# Patient Record
Sex: MALE | Race: WHITE | NOT HISPANIC OR LATINO | Employment: STUDENT | URBAN - METROPOLITAN AREA
[De-identification: names, ages, dates, MRNs, and addresses within clinical notes are randomized per-mention and may not be internally consistent; named-entity substitution may affect disease eponyms.]

---
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
## 2022-02-21 ENCOUNTER — TELEPHONE (OUTPATIENT)
Dept: PODIATRY | Facility: CLINIC | Age: 22
End: 2022-02-21
Payer: MEDICAID

## 2022-02-21 NOTE — TELEPHONE ENCOUNTER
Pts mother declined an appointment at a different location than [place] for an ingrown toenail. She states she will go outside of [facility].

## 2022-02-21 NOTE — TELEPHONE ENCOUNTER
----- Message from César Cline sent at 2/21/2022  9:56 AM CST -----  Contact: mother  Pt needs to schedule appt for ingrown toe nail    Call back mother @[phone]

## 2022-05-03 ENCOUNTER — OFFICE VISIT (OUTPATIENT)
Dept: PODIATRY | Facility: CLINIC | Age: 22
End: 2022-05-03
Payer: MEDICAID

## 2022-05-03 VITALS
BODY MASS INDEX: 35.81 KG/M2 | HEIGHT: 75 IN | HEART RATE: 89 BPM | DIASTOLIC BLOOD PRESSURE: 69 MMHG | SYSTOLIC BLOOD PRESSURE: 127 MMHG | WEIGHT: 288 LBS

## 2022-05-03 DIAGNOSIS — L60.3 NONTRAUMATIC NAIL SPLITTING: ICD-10-CM

## 2022-05-03 DIAGNOSIS — M12.879 TRANSIENT ARTHROPATHY INVOLVING ANKLE AND FOOT, UNSPECIFIED LATERALITY: ICD-10-CM

## 2022-05-03 DIAGNOSIS — L84 CORN OF TOE: Primary | ICD-10-CM

## 2022-05-03 PROCEDURE — 1159F PR MEDICATION LIST DOCUMENTED IN MEDICAL RECORD: ICD-10-PCS | Mod: CPTII,,, | Performed by: PODIATRIST

## 2022-05-03 PROCEDURE — 99999 PR PBB SHADOW E&M-EST. PATIENT-LVL III: CPT | Mod: PBBFAC,,, | Performed by: PODIATRIST

## 2022-05-03 PROCEDURE — 3078F PR MOST RECENT DIASTOLIC BLOOD PRESSURE < 80 MM HG: ICD-10-PCS | Mod: CPTII,,, | Performed by: PODIATRIST

## 2022-05-03 PROCEDURE — 1159F MED LIST DOCD IN RCRD: CPT | Mod: CPTII,,, | Performed by: PODIATRIST

## 2022-05-03 PROCEDURE — 99999 PR PBB SHADOW E&M-EST. PATIENT-LVL III: ICD-10-PCS | Mod: PBBFAC,,, | Performed by: PODIATRIST

## 2022-05-03 PROCEDURE — 3008F BODY MASS INDEX DOCD: CPT | Mod: CPTII,,, | Performed by: PODIATRIST

## 2022-05-03 PROCEDURE — 99203 PR OFFICE/OUTPT VISIT, NEW, LEVL III, 30-44 MIN: ICD-10-PCS | Mod: S$PBB,,, | Performed by: PODIATRIST

## 2022-05-03 PROCEDURE — 99213 OFFICE O/P EST LOW 20 MIN: CPT | Mod: PBBFAC,PN | Performed by: PODIATRIST

## 2022-05-03 PROCEDURE — 99203 OFFICE O/P NEW LOW 30 MIN: CPT | Mod: S$PBB,,, | Performed by: PODIATRIST

## 2022-05-03 PROCEDURE — 3078F DIAST BP <80 MM HG: CPT | Mod: CPTII,,, | Performed by: PODIATRIST

## 2022-05-03 PROCEDURE — 3074F PR MOST RECENT SYSTOLIC BLOOD PRESSURE < 130 MM HG: ICD-10-PCS | Mod: CPTII,,, | Performed by: PODIATRIST

## 2022-05-03 PROCEDURE — 3074F SYST BP LT 130 MM HG: CPT | Mod: CPTII,,, | Performed by: PODIATRIST

## 2022-05-03 PROCEDURE — 3008F PR BODY MASS INDEX (BMI) DOCUMENTED: ICD-10-PCS | Mod: CPTII,,, | Performed by: PODIATRIST

## 2022-05-03 NOTE — PROGRESS NOTES
Subjective:      Patient ID: Bro Lozada is a 21 y.o. male.    Chief Complaint: Foot Pain (Both feet. L little toe)    Bro GUO is a 21 y.o. male who presents to the podiatry clinic, accompanied by his mother, with complaint of ingrown toenail L 5th toe as well as bilateral midfoot pain.   L little toe pain w/ walking, dependent on shoe type, over past several weeks or months.  B/L midfoot pain - Onset of the symptoms was earlier teens when started growing & trying to balance - proprioceptive problems & sensory issues due to autism. Current symptoms include: worsening symptoms after a period of activity. 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 Symptoms have waxed and waned. Pain level 8/10 when occurs.    Past Medical History:   Diagnosis Date    Asperger's disorder     Kyphosis      Patient Active Problem List   Diagnosis    Kyphosis    Palpitations    Pediatric obesity due to excess calories without serious comorbidity    SVT (supraventricular tachycardia)     PCP: Ramonita Fields NP     Objective:      Review of Systems   Unable to perform ROS: other (mother answers most questions including subjective c/o)   Cardiovascular: Negative for leg swelling.   Skin: Positive for dry skin and suspicious lesions. Negative for color change, itching and rash.   Musculoskeletal: Positive for back pain, muscle weakness and myalgias. Negative for falls, joint pain and muscle cramps.   Neurological: Positive for weakness. Negative for focal weakness, loss of balance, numbness and paresthesias.   Psychiatric/Behavioral: The patient is not nervous/anxious.      Physical Exam  Vitals reviewed.   Constitutional:       General: He is not in acute distress.     Appearance: He is obese.   Cardiovascular:      Pulses: Normal pulses.           Dorsalis pedis pulses are 2+ on the right side and 2+ on the left side.   Musculoskeletal:         General: Tenderness present. No swelling  or signs of injury.      Right lower leg: Edema present.      Left lower leg: Edema present.      Right foot: Normal range of motion. Deformity (pes planus B/L) present.      Left foot: Normal range of motion. Deformity present.   Feet:      Right foot:      Skin integrity: Skin integrity normal.      Left foot:      Skin integrity: Skin integrity normal.      Comments: No reproducible discomfort to B/L MLA nor dorsal midfoot; no pain nor crepitation to ROM foot & ankle joints B/L    Musculoskeletal strength of extrinsics to foot +5/5 without deficit nor pain.    Adductovarus 5th digits B/L.  Skin:     General: Skin is warm.      Capillary Refill: Capillary refill takes less than 2 seconds.      Findings: Lesion present. No bruising, erythema or rash.      Comments: Hyperkeratotic lesion lateral L 5th toenail border consistent w/ Amira's corn; no evidence of cryptosis in this location - symptomatic.  Larger, corn or longitudinal split of lateral nail plate R 5th toe - asymptomatic.    Neurological:      Mental Status: He is alert and oriented to person, place, and time.      Sensory: No sensory deficit.      Motor: No weakness.      Gait: Gait normal.      Deep Tendon Reflexes:      Reflex Scores:       Patellar reflexes are 3+ on the right side.     Comments: Difficult to evaluate as patient agrees to pain non-specifically B/L feet   Psychiatric:         Attention and Perception: He is inattentive.         Mood and Affect: Mood is not anxious. Affect is flat.         Speech: Speech is delayed.         Behavior: Behavior is cooperative.         Assessment:      Encounter Diagnoses   Name Primary?    Corn of toe Yes    Nontraumatic nail splitting     Transient arthropathy involving ankle and foot, unspecified laterality        Plan:       Bro GUO was seen today for foot pain.    Diagnoses and all orders for this visit:    Corn of toe    Nontraumatic nail splitting    Transient arthropathy involving ankle and  foot, unspecified laterality    I counseled the patient on his conditions, their implications and medical management.    - Shoe inspection. Patient instructed on proper foot hygeine. We discussed wearing proper protective shoe gear - mother states he is sensitive to most shoes so only limited to certain minimal shoes due to his sensory issues.    - With patient's permission, hyperkeratosis L 5th toe & nail/corn R 5th digit debrided  to their soft tissue attachment mechanically. Patient relates relief following the procedure.    Dispensed a pair of Silopos gelsstraps for MLA, & patient will see if he can tolerate it.    F/U prn

## 2022-08-22 ENCOUNTER — TELEPHONE (OUTPATIENT)
Dept: PODIATRY | Facility: CLINIC | Age: 22
End: 2022-08-22
Payer: MEDICAID

## 2022-08-22 NOTE — TELEPHONE ENCOUNTER
----- Message from Akanksha Tracey sent at 8/22/2022  1:08 PM CDT -----  Regarding: Insoles  Contact: mom @ [phone]  Mom is calling to see where to order the insoles for pt. Asking for a call back

## 2022-08-22 NOTE — TELEPHONE ENCOUNTER
Spoke with Mom. Mom advise to get Visco-gel universal metatarsal strap - Large/Xlarge Left and Right from [employer] or [employer]. Pt needs appt and Dr Canada might Rx another pair.

## 2024-01-24 ENCOUNTER — OFFICE VISIT (OUTPATIENT)
Dept: GASTROENTEROLOGY | Facility: CLINIC | Age: 24
End: 2024-01-24
Payer: MEDICAID

## 2024-01-24 VITALS
HEART RATE: 91 BPM | BODY MASS INDEX: 29.9 KG/M2 | WEIGHT: 240.44 LBS | HEIGHT: 75 IN | OXYGEN SATURATION: 95 % | DIASTOLIC BLOOD PRESSURE: 70 MMHG | SYSTOLIC BLOOD PRESSURE: 108 MMHG

## 2024-01-24 DIAGNOSIS — K92.1 HEMATOCHEZIA: Primary | ICD-10-CM

## 2024-01-24 PROCEDURE — 99204 OFFICE O/P NEW MOD 45 MIN: CPT | Mod: S$PBB,,, | Performed by: INTERNAL MEDICINE

## 2024-01-24 PROCEDURE — 3078F DIAST BP <80 MM HG: CPT | Mod: CPTII,,, | Performed by: INTERNAL MEDICINE

## 2024-01-24 PROCEDURE — 99999 PR PBB SHADOW E&M-EST. PATIENT-LVL V: CPT | Mod: PBBFAC,,, | Performed by: INTERNAL MEDICINE

## 2024-01-24 PROCEDURE — 3008F BODY MASS INDEX DOCD: CPT | Mod: CPTII,,, | Performed by: INTERNAL MEDICINE

## 2024-01-24 PROCEDURE — 3074F SYST BP LT 130 MM HG: CPT | Mod: CPTII,,, | Performed by: INTERNAL MEDICINE

## 2024-01-24 PROCEDURE — 1159F MED LIST DOCD IN RCRD: CPT | Mod: CPTII,,, | Performed by: INTERNAL MEDICINE

## 2024-01-24 PROCEDURE — 99215 OFFICE O/P EST HI 40 MIN: CPT | Mod: PBBFAC,PN | Performed by: INTERNAL MEDICINE

## 2024-01-24 RX ORDER — SODIUM, POTASSIUM,MAG SULFATES 17.5-3.13G
1 SOLUTION, RECONSTITUTED, ORAL ORAL DAILY
Qty: 1 KIT | Refills: 0 | Status: SHIPPED | OUTPATIENT
Start: 2024-01-24 | End: 2024-01-26

## 2024-01-24 RX ORDER — FLUOXETINE HYDROCHLORIDE 20 MG/1
CAPSULE ORAL
COMMUNITY
Start: 2024-01-23

## 2024-01-24 NOTE — PROGRESS NOTES
"Subjective:       Patient ID: Bro Lozada is a 23 y.o. male.    Chief Complaint: Rectal Bleeding and Anemia    Patient here today to establish care with aforementioned complaints.  Patient reportedly has been experiencing bright red blood per rectum on and off for the past week.  Preceding symptoms he apparently had contracted a viral illness with symptoms including nausea, malaise, and diarrhea.  They ultimately resolved however he continues to experience blood intermittently with bowel movements.  His mother also noted a piece of "tissue" passed in his stool over the weekend.  About a year ago he had a similar episode to hemorrhoids at that time.  Modified diet and lost approximately 25 lb ultimate improvement in his symptoms.    He denies family history of IBD.  Both he and his mother have psoriasis.      Past Medical History:   Diagnosis Date    Asperger's disorder     Kyphosis        Past Surgical History:   Procedure Laterality Date    TONSILLECTOMY, ADENOIDECTOMY         Social History  Social History     Tobacco Use    Smoking status: Never    Smokeless tobacco: Never   Substance Use Topics    Alcohol use: No    Drug use: No       Family History   Problem Relation Age of Onset    Cardiomyopathy Neg Hx     Congenital heart disease Neg Hx     Early death Neg Hx     Long QT syndrome Neg Hx     SIDS Neg Hx        Review of Systems   Gastrointestinal:  Positive for anal bleeding and blood in stool. Negative for diarrhea.           Objective:      Physical Exam  Constitutional:       Appearance: He is well-developed.   HENT:      Head: Normocephalic and atraumatic.   Eyes:      Conjunctiva/sclera: Conjunctivae normal.   Pulmonary:      Effort: Pulmonary effort is normal. No respiratory distress.   Neurological:      Mental Status: He is alert and oriented to person, place, and time.   Psychiatric:         Behavior: Behavior normal.         Thought Content: Thought content normal.         Judgment: Judgment " normal.           Pertinent labs and imaging studies reviewed    Assessment:       1. Hematochezia        Plan:       Improving  Likely hemorrhoidal however given history of psoriasis will schedule colonoscopy to exclude IBD    (Portions of this note were dictated using voice recognition software and may contain dictation related errors in spelling/grammar/syntax not found on text review)

## 2024-01-30 ENCOUNTER — TELEPHONE (OUTPATIENT)
Dept: GASTROENTEROLOGY | Facility: CLINIC | Age: 24
End: 2024-01-30
Payer: MEDICAID

## 2024-01-30 NOTE — TELEPHONE ENCOUNTER
Patients mother wanted to know how much blood her son has to loose before going to the ER. She was told that if she feels like to much blood is being loss then they should go. Mom states Im not answering her question. I told her because of my title I can not make that call but she feels like its to much please go to the ER. Mother was upset.     ----- Message from Mychal Metzger MD sent at 1/30/2024  8:00 AM CST -----  Regarding: RE: advise; current symptoms  Contact: Cari (Mom) @ [phone]  Not sure what the question is. We are doing the colonoscopy to evaluate for potential sources of blood loss.   ----- Message -----  From: Robert Dye MA  Sent: 1/25/2024   2:11 PM CST  To: Mychal Metzger MD  Subject: FW: advise; current symptoms                     Please advise  ----- Message -----  From: Ventura Azul  Sent: 1/25/2024   1:47 PM CST  To: [facility] Staff  Subject: advise; current symptoms                         CallerCari (Mom) is calling to speak to someone in the office to discuss symptoms pt is currently having. Please call to advise.     Symptoms: Mom states that when pt came for appt yesterday, 1/242024 she had the nurse about pt having blood in stool and her questions never got answered.     How long has patient had these symptoms:      Best call back number: Cari (Mom) @ [phone]

## 2024-02-06 ENCOUNTER — TELEPHONE (OUTPATIENT)
Dept: GASTROENTEROLOGY | Facility: CLINIC | Age: 24
End: 2024-02-06
Payer: MEDICAID

## 2024-02-06 ENCOUNTER — PATIENT MESSAGE (OUTPATIENT)
Dept: GASTROENTEROLOGY | Facility: CLINIC | Age: 24
End: 2024-02-06
Payer: MEDICAID

## 2024-02-06 NOTE — TELEPHONE ENCOUNTER
A call was placed to this patient re: a request for instructions on  the Miralax Colonoscopy Bowel Prep. I spoke with the patient's mother(Cari Carpio) In addition to the Miralax prep instructions being put onto the [employer] portal,The patient's mother was explained and instructed as below:             Miralax Prep  [facility]   [address] [place] [address]    You are schedule for a Colonoscopy with Dr Mychal Metzger MD____________ on _02/07/2024__________ at [facility] in [place].   Check in at the hospital - 1st floor registration   Call [phone] to reschedule      An adult friend/family member must come with you to drive you home. You cannot drive, take a taxi, Uber/Lyft or bus to leave the Hospital alone.  If you do not have someone to drive you home, your test will be canceled.   Please follow the directions your doctor if you take any pills that will thin your blood.  If you take these meds: Aggrenox, Brilinta, Effient, Eliquis, Lovenox, Plavix Pletal, Pradaxa, Ticilid, Xarelto or Coumadin, let the doctor's office know.   Don't: On the morning of the test do not take insulin or pills for diabetes.  Do on the morning of the test, do take any pills for blood pressure, heart, anti-rejections   and or seizures with a small sip of water. Bring any inhalers with you.  To have a good prep, you must follow these instructions-please do not use the directions from the pharmacy.  A Covid test is required 72 hours before the procedure.     Leave all valuables and jewelry at home. You will be at the hospital for 2 to 4 hours.     You need to buy medicine from the drugstore. You do not need a prescription from the doctor. Generic medicine is ok.   4 Dulcolax pills- 5mg  1 Bottle of Mirlax- 238gram bottle  128 ounces of Gatorade (yellow or green)        Call Endoscopy Scheduling Department at [phone] with any questions about your procedure.       The Day Before the  test:  You can only drink CLEAR LIQUIDS the whole day before your test. You can't eat any food for the whole day.    You CAN have: Water, Coffee or decaf coffee (no milk or cream), , Soft drinksTea- regular and sugar free, Jell-O (green or yellow), Apple Juice, White cranberry juice, Gatorade Power aid, Crystal Light, Jesus Manuel Aid, Lemonade and Limeade, Bouillon, clear soup, snowball, popsicles    YOU CAN'T DRINK ANYTHING RED BLUE PURPLE ORANGE    YOU CAN'T DRINK ALCOHOL ONLY DRINK WHAT IS ON THE LIST.    At 4 pm   Take 4 pills of Dulcolax    At 6pm:  Drink 1 glass (8 ounces) every 10 minutes until 64 ounces of Gatorade is finished.   (Keep it cold and in refrigerator as much as you can while drinking it).   Add 1 capfuls of Miralax to each 8 ounces of Gatorade= 4 capfuls in 32-ounce bottle = 8 capfuls in 64-ounces bottle    Make it in the morning. Put it in the refrigerator AFTER you make it. It tastes better if it is cold. Do NOT put this solution over ice. It is ok to drink with a straw.   8  The Day of the test- We will call you 1 day before your test to tell you what time to get there.     .  5 hours before you are scheduled to arrive at the hospital:  Drink 1 glass (8 ounces) every 10 minutes until 64 ounces of Gatorade is finished. (Keep it cold and in refrigerator as much as you can while drinking it).   Add 1 capful of Miralax to each 8 ounces of Gatorade; 4 capfuls in 32-ounce bottle; 8 capfuls in 64-ounce bottle.   Make it in the morning. Put in the refrigerator AFTER you make it. It tastes better if it is cold. Do NOT put this solution over ice It is ok to drink with a straw.        YOU CAN'T EAT OR DRINK ANYTHING ELSE ONCE YOU FINISH THE PREP  .

## 2024-02-19 ENCOUNTER — TELEPHONE (OUTPATIENT)
Dept: GASTROENTEROLOGY | Facility: CLINIC | Age: 24
End: 2024-02-19
Payer: MEDICAID

## 2024-02-19 NOTE — TELEPHONE ENCOUNTER
Gave patient appt for 4/3/2024 and put him on the wait list also.      ----- Message from Mychal Metzger MD sent at 2/15/2024  4:15 PM CST -----  Biopsies suggestive of ulcerative colitis. Continue budesonide. Schedule f/u with me. Does not need to be overbooked.

## 2024-05-29 ENCOUNTER — OFFICE VISIT (OUTPATIENT)
Dept: GASTROENTEROLOGY | Facility: CLINIC | Age: 24
End: 2024-05-29
Payer: MEDICAID

## 2024-05-29 VITALS
OXYGEN SATURATION: 97 % | HEIGHT: 75 IN | WEIGHT: 241.63 LBS | BODY MASS INDEX: 30.04 KG/M2 | DIASTOLIC BLOOD PRESSURE: 73 MMHG | HEART RATE: 91 BPM | SYSTOLIC BLOOD PRESSURE: 107 MMHG

## 2024-05-29 DIAGNOSIS — Z79.899 HIGH RISK MEDICATION USE: ICD-10-CM

## 2024-05-29 DIAGNOSIS — K51.011 ULCERATIVE (CHRONIC) PANCOLITIS WITH RECTAL BLEEDING: Primary | ICD-10-CM

## 2024-05-29 PROCEDURE — 3078F DIAST BP <80 MM HG: CPT | Mod: CPTII,,, | Performed by: INTERNAL MEDICINE

## 2024-05-29 PROCEDURE — 1159F MED LIST DOCD IN RCRD: CPT | Mod: CPTII,,, | Performed by: INTERNAL MEDICINE

## 2024-05-29 PROCEDURE — 99213 OFFICE O/P EST LOW 20 MIN: CPT | Mod: PBBFAC,PN | Performed by: INTERNAL MEDICINE

## 2024-05-29 PROCEDURE — 3008F BODY MASS INDEX DOCD: CPT | Mod: CPTII,,, | Performed by: INTERNAL MEDICINE

## 2024-05-29 PROCEDURE — 99214 OFFICE O/P EST MOD 30 MIN: CPT | Mod: S$PBB,,, | Performed by: INTERNAL MEDICINE

## 2024-05-29 PROCEDURE — 99999 PR PBB SHADOW E&M-EST. PATIENT-LVL III: CPT | Mod: PBBFAC,,, | Performed by: INTERNAL MEDICINE

## 2024-05-29 PROCEDURE — 3074F SYST BP LT 130 MM HG: CPT | Mod: CPTII,,, | Performed by: INTERNAL MEDICINE

## 2024-05-29 RX ORDER — DEXTROAMPHETAMINE SACCHARATE, AMPHETAMINE ASPARTATE, DEXTROAMPHETAMINE SULFATE AND AMPHETAMINE SULFATE 5; 5; 5; 5 MG/1; MG/1; MG/1; MG/1
1 TABLET ORAL 2 TIMES DAILY
COMMUNITY
Start: 2024-05-11

## 2024-05-29 NOTE — PROGRESS NOTES
Subjective:       Patient ID: Bro Lozada is a 23 y.o. male.    Chief Complaint: Follow-up    Patient here today to follow-up with aforementioned complaints.  He was previously seen by me in clinic in January with complaints of hematochezia.  He was sent for colonoscopy performed by me in February.  Exam was significant for diffuse spanning from the rectum to the ascending colon.  Biopsy was suspicious for IBD.  On budesonide instructed follow-up.  Today patient reports    Symptoms improved on bud. Ran out/has been off for 3 weeks. Mother is uncertain if he still needs it.   Review of Systems   Gastrointestinal:  Negative for abdominal distention, abdominal pain, blood in stool and diarrhea.         Patient is accompanied by his mother who corroborates above history.    The following portions of the patient's history were reviewed and updated as appropriate: allergies, current medications, past family history, past medical history, past social history, past surgical history and problem list.    Objective:      Physical Exam  Constitutional:       Appearance: He is well-developed.   HENT:      Head: Normocephalic and atraumatic.   Eyes:      Conjunctiva/sclera: Conjunctivae normal.   Pulmonary:      Effort: Pulmonary effort is normal. No respiratory distress.   Neurological:      Mental Status: He is alert and oriented to person, place, and time.   Psychiatric:         Behavior: Behavior normal.         Thought Content: Thought content normal.         Judgment: Judgment normal.           Pertinent labs and imaging studies reviewed    Assessment:       1. Ulcerative (chronic) pancolitis with rectal bleeding        Plan:       Improved with budesonide, now off   Complaints while off budesonide I feel like they will returned.  Discussed risk of untreated inflammation with patient and mother.  They seem to agree   Given concurrent psoriasis patient would be candidate for Stelara.  Will check pretreatment labs    Transportation is somewhat an issue however mother believes it would be easiest to get to and from infusion center at [place] there was not 1 available here in [place].  Will take that into account    (Portions of this note were dictated using voice recognition software and may contain dictation related errors in spelling/grammar/syntax not found on text review)

## 2025-03-19 ENCOUNTER — PATIENT MESSAGE (OUTPATIENT)
Dept: GASTROENTEROLOGY | Facility: CLINIC | Age: 25
End: 2025-03-19
Payer: MEDICAID

## 2025-03-19 ENCOUNTER — TELEPHONE (OUTPATIENT)
Dept: GASTROENTEROLOGY | Facility: CLINIC | Age: 25
End: 2025-03-19
Payer: MEDICAID

## 2025-03-19 NOTE — TELEPHONE ENCOUNTER
I gave the mother the information from Dr Metzger about the diet. I sent a my chart message with the link

## 2025-03-19 NOTE — TELEPHONE ENCOUNTER
----- Message from Mychal Metzger MD sent at 3/19/2025  2:12 PM CDT -----  Great. I look forward to them following up. As far as diet this is really the best resource I am aware of. Feel free to forward to them.[email or web address]  ----- Message -----  From: Bossman Hightower MA  Sent: 3/18/2025   2:07 PM CDT  To: Mychal Metzger MD    Patient is scheduled for 4/3/2025 virtual with you and will get the labs done on 3/25/2025. Mother wants to know who they can talk to about diet for the ulcerative colitis  ----- Message -----  From: Robert Dye MA  Sent: 3/18/2025   1:41 PM CDT  To: [facility] Clinical Support      ----- Message -----  From: Livia Nix  Sent: 3/18/2025  12:08 PM CDT  To: [facility] Staff    Type:  Sooner Apoointment RequestCaller is requesting a sooner appointment.  Caller declined first available appointment listed below.  Caller will not accept being placed on the waitlist and is requesting a message be sent to doctor.Name of Caller:ptWhen is the first available appointment?n/aSymptoms:blood in stool Would the patient rather a call back or a response via [facility]? Call [facility] Call Back Number:[phone] Information: would also like to know who to go to discuss dieting

## 2025-04-03 ENCOUNTER — PATIENT MESSAGE (OUTPATIENT)
Dept: GASTROENTEROLOGY | Facility: CLINIC | Age: 25
End: 2025-04-03
Payer: MEDICAID

## 2025-04-03 ENCOUNTER — OFFICE VISIT (OUTPATIENT)
Dept: GASTROENTEROLOGY | Facility: CLINIC | Age: 25
End: 2025-04-03
Payer: MEDICAID

## 2025-04-03 DIAGNOSIS — K51.011 ULCERATIVE (CHRONIC) PANCOLITIS WITH RECTAL BLEEDING: Primary | ICD-10-CM

## 2025-04-03 DIAGNOSIS — Z79.899 HIGH RISK MEDICATION USE: ICD-10-CM

## 2025-04-03 NOTE — PROGRESS NOTES
Subjective:       Patient ID: Bro Lozada is a 24 y.o. male.    Chief Complaint: UC    Telemedicine encounter today to follow-up with aforementioned complaints.  Patient was previously seen by me last May for follow-up of ulcerative pancolitis.  Patient's symptoms improved/resolved while on budesonide.  Biologic was offered however deferred by patient.  Today patient reports    Recent flare around hardeep gras: blood in stool, diarrhea/constipation.  Lasted for a few weeks. Refilled budesonide. Modified diet. Bleeding stopped after about 2 weeks but diarrhea persisted.     Weight loss of about 15 lbs    Bm vary.  Will have anywhere from 2-4 bm/day. At its worst will have 7+ bm/day with multiple nocturnal awakenings     Would like to see a dietitian.      Review of Systems   Constitutional:  Positive for unexpected weight change.   Gastrointestinal:  Positive for blood in stool and diarrhea.       The following portions of the patient's history were reviewed and updated as appropriate: allergies, current medications, past family history, past medical history, past social history, past surgical history and problem list.    Objective:      Physical Exam  Constitutional:       Appearance: He is well-developed.   HENT:      Head: Normocephalic and atraumatic.   Eyes:      Conjunctiva/sclera: Conjunctivae normal.   Pulmonary:      Effort: Pulmonary effort is normal. No respiratory distress.   Neurological:      Mental Status: He is alert and oriented to person, place, and time.   Psychiatric:         Behavior: Behavior normal.         Thought Content: Thought content normal.         Judgment: Judgment normal.           Pertinent labs and imaging studies reviewed    Assessment:       1. Ulcerative (chronic) pancolitis with rectal bleeding        Plan:       Somewhat better controlled with budesonide, currently off   Discussed ongoing intermittent treatment versus starting maintenance therapy.  Patient would prefer the  latter  David complete form filled out and faxed   Will send prescription to [facility]    The patient location is: home  The chief complaint leading to consultation is: UC    Visit type: audiovisual      40 minutes of total time spent on the encounter, which includes face to face time and non-face to face time preparing to see the patient (eg, review of tests), Obtaining and/or reviewing separately obtained history, Documenting clinical information in the electronic or other health record, Independently interpreting results (not separately reported) and communicating results to the patient/family/caregiver, or Care coordination (not separately reported).         Each patient to whom he or she provides medical services by telemedicine is:  (1) informed of the relationship between the physician and patient and the respective role of any other health care provider with respect to management of the patient; and (2) notified that he or she may decline to receive medical services by telemedicine and may withdraw from such care at any time.    Notes:       (Portions of this note were dictated using voice recognition software and may contain dictation related errors in spelling/grammar/syntax not found on text review)

## 2025-04-07 ENCOUNTER — TELEPHONE (OUTPATIENT)
Dept: GASTROENTEROLOGY | Facility: CLINIC | Age: 25
End: 2025-04-07
Payer: MEDICAID

## 2025-04-07 NOTE — TELEPHONE ENCOUNTER
----- Message from Med Assistant Jones sent at 4/4/2025  8:56 AM CDT -----  Regarding: FW: Complete demographics  Contact: Val    ----- Message -----  From: Tosin Villalta  Sent: 4/4/2025   8:48 AM CDT  To: [facility] Staff  Subject: Complete demographics                            Consult/Advisory Name Of Caller: Val  Contact Preference:[phone]= [phone] ( [phone]) ( Phone) Nature of call: David  Complete calling to get complete demographics for the pt , including the address and insurance info.

## 2025-04-08 ENCOUNTER — TELEPHONE (OUTPATIENT)
Dept: GASTROENTEROLOGY | Facility: CLINIC | Age: 25
End: 2025-04-08
Payer: MEDICAID

## 2025-04-08 NOTE — TELEPHONE ENCOUNTER
----- Message from Med Assistant Jones sent at 4/8/2025 10:35 AM CDT -----    ----- Message -----  From: Yael Amaya  Sent: 4/8/2025  10:29 AM CDT  To: [facility] Staff    .Type:  Needs Medical AdviceWho Called: Estefanía with [facility]Would the patient rather a call back or a response via [facility]? Call [facility] Call Back Number: [phone] Information:  Estefanía stated she would like a call back to verify if a letter was received for pt skyrizi and the doctor needs to send an authorization   Spiritual Plan of Care    Pt Name: Yoselyn Cisneros  Pt : 1961  Date: 2022    Visit Type: In person    Referral Source: Patient    Reason for Visit: Routine Visit (Karina following)    Visited With: Patient not available (Sleeping)    Length of Visit: 15 minutes    Follow-up Reason: Karina following     Spiritual Care Visit Preference: Both    Taxonomy:    · Intended Effects: Demonstrate caring and concern  · Methods: Offer support  · Interventions: Acknowledge current situation      Patient Affect at Time of Visit: Patient sleeping

## 2025-04-10 ENCOUNTER — PATIENT MESSAGE (OUTPATIENT)
Dept: GASTROENTEROLOGY | Facility: CLINIC | Age: 25
End: 2025-04-10
Payer: MEDICAID

## 2025-04-14 ENCOUNTER — TELEPHONE (OUTPATIENT)
Dept: GASTROENTEROLOGY | Facility: CLINIC | Age: 25
End: 2025-04-14
Payer: MEDICAID

## 2025-04-14 NOTE — TELEPHONE ENCOUNTER
----- Message from Med Assistant Jones sent at 4/14/2025 12:00 PM CDT -----    ----- Message -----  From: Felicia Melvin  Sent: 4/14/2025  11:54 AM CDT  To: [facility] Staff    Type:  Needs Medical AdviceWho Called: Doc [facility] care Symptoms (please be specific):  How long has patient had these symptoms:  Pharmacy name and phone #:  Would the patient rather a call back or a response via [facility]? Call [facility] Call Back Number: [phone] Information: Authorization for his infusion

## 2025-04-15 ENCOUNTER — TELEPHONE (OUTPATIENT)
Dept: GASTROENTEROLOGY | Facility: CLINIC | Age: 25
End: 2025-04-15
Payer: MEDICAID

## 2025-04-15 NOTE — TELEPHONE ENCOUNTER
----- Message from Med Assistant Jones sent at 4/15/2025  2:47 PM CDT -----    ----- Message -----  From: Yael Amaya  Sent: 4/15/2025   2:43 PM CDT  To: [facility] Staff    .Type:  Needs Medical AdviceWho Called: pt mom AnngregWould the patient rather a call back or a response via [facility]? Call [facility] Call Back Number: [phone] Information: Pt mom stated she would like a call back as soon as possible regarding pt medication

## 2025-04-16 ENCOUNTER — TELEPHONE (OUTPATIENT)
Dept: GASTROENTEROLOGY | Facility: CLINIC | Age: 25
End: 2025-04-16
Payer: MEDICAID

## 2025-04-16 DIAGNOSIS — K51.919 ULCERATIVE COLITIS WITH COMPLICATION, UNSPECIFIED LOCATION: Primary | ICD-10-CM

## 2025-04-16 RX ORDER — ADALIMUMAB 80MG/0.8ML
80 KIT SUBCUTANEOUS
Qty: 3 PEN | Refills: 0 | Status: SHIPPED | OUTPATIENT
Start: 2025-04-16 | End: 2025-05-14

## 2025-04-16 RX ORDER — ADALIMUMAB 40MG/0.4ML
40 KIT SUBCUTANEOUS
Qty: 2 PEN | Refills: 11 | Status: SHIPPED | OUTPATIENT
Start: 2025-04-16 | End: 2026-04-16

## 2025-04-16 NOTE — TELEPHONE ENCOUNTER
----- Message from Mychal Metzger MD sent at 4/16/2025 12:09 PM CDT -----  Please check the status with Alissa Alvarez  ----- Message -----  From: Bossman Hightower MA  Sent: 4/10/2025   7:56 AM CDT  To: Mychal Metzger MD    Where is the patient to have the Skyrizi started ?  The mother called and is upset because she has not gotten and calls about where he has to go to do the injections  ----- Message -----  From: Robert Dye MA  Sent: 4/9/2025   2:50 PM CDT  To: [facility] Clinical Support      ----- Message -----  From: Yael Amaya  Sent: 4/9/2025   2:02 PM CDT  To: [facility] Staff    .Type:  Needs Medical AdviceWho Called: pt mom Pedroould the patient rather a call back or a response via [facility]? Call [facility] Call Back Number: [phone] Information: Pt mom stated she would like a call back please

## 2025-04-21 ENCOUNTER — TELEPHONE (OUTPATIENT)
Dept: GASTROENTEROLOGY | Facility: CLINIC | Age: 25
End: 2025-04-21
Payer: MEDICAID

## 2025-04-21 NOTE — TELEPHONE ENCOUNTER
"----- Message from Med Assistant Jones sent at 4/21/2025  1:18 PM CDT -----  Regarding: FW: call back    ----- Message -----  From: Phill Rashid  Sent: 4/21/2025  12:13 PM CDT  To: Domenica Horta Staff  Subject: call back                                        Consult/Advisory:  Name Of Caller: Denise with [facility] Contact Preference?:[phone] What is the nature of the call?: Calling to speak w/ Bossman in regards to an update on the pts Skyrizi injections. Requesting call back  Additional Notes:"Thank you for all that you do for our patients"  "

## 2025-05-02 ENCOUNTER — TELEPHONE (OUTPATIENT)
Dept: GASTROENTEROLOGY | Facility: CLINIC | Age: 25
End: 2025-05-02
Payer: MEDICAID

## 2025-05-02 NOTE — TELEPHONE ENCOUNTER
----- Message from Med Assistant Keita sent at 4/24/2025  2:13 PM CDT -----  Does pt need any blood work or a f/u visit with you Domenica

## 2025-05-05 ENCOUNTER — TELEPHONE (OUTPATIENT)
Dept: GASTROENTEROLOGY | Facility: CLINIC | Age: 25
End: 2025-05-05
Payer: MEDICAID

## 2025-05-05 DIAGNOSIS — K51.919 ULCERATIVE COLITIS WITH COMPLICATION, UNSPECIFIED LOCATION: Primary | ICD-10-CM

## 2025-05-05 NOTE — TELEPHONE ENCOUNTER
Patient is scheduled for 6/25/2025 to see Dr Reyes. Mother is asking for labs to check for vitamin deficiency. I SENT DR REYES A MESSAGE

## 2025-05-07 ENCOUNTER — PATIENT MESSAGE (OUTPATIENT)
Dept: GASTROENTEROLOGY | Facility: CLINIC | Age: 25
End: 2025-05-07
Payer: MEDICAID

## 2025-05-08 ENCOUNTER — TELEPHONE (OUTPATIENT)
Dept: GASTROENTEROLOGY | Facility: CLINIC | Age: 25
End: 2025-05-08
Payer: MEDICAID

## 2025-05-08 DIAGNOSIS — K51.919 ULCERATIVE COLITIS WITH COMPLICATION, UNSPECIFIED LOCATION: ICD-10-CM

## 2025-05-08 RX ORDER — ADALIMUMAB 40MG/0.4ML
40 KIT SUBCUTANEOUS
Qty: 2 PEN | Refills: 11 | Status: SHIPPED | OUTPATIENT
Start: 2025-05-08 | End: 2025-05-08

## 2025-05-08 RX ORDER — ADALIMUMAB 40MG/0.4ML
40 KIT SUBCUTANEOUS
Qty: 2 PEN | Refills: 11 | Status: SHIPPED | OUTPATIENT
Start: 2025-05-08 | End: 2026-05-08

## 2025-05-08 NOTE — TELEPHONE ENCOUNTER
----- Message from Mychal Metzger MD sent at 5/8/2025 12:52 PM CDT -----  Orders placed  ----- Message -----  From: Bossman Hightower MA  Sent: 5/5/2025  11:29 AM CDT  To: Mychal Metzger MD    Patient's mother wants to know if you will order labs for him. Like vitamin deficiency's ?????????????

## 2025-05-08 NOTE — ADDENDUM NOTE
Addended by: JEMIMA REYES on: 5/8/2025 02:52 PM     Modules accepted: Orders     Barber Holcomb(Attending)

## 2025-05-13 ENCOUNTER — PATIENT MESSAGE (OUTPATIENT)
Dept: GASTROENTEROLOGY | Facility: CLINIC | Age: 25
End: 2025-05-13

## 2025-05-15 ENCOUNTER — PATIENT MESSAGE (OUTPATIENT)
Dept: GASTROENTEROLOGY | Facility: CLINIC | Age: 25
End: 2025-05-15
Payer: MEDICAID

## 2025-05-16 ENCOUNTER — TELEPHONE (OUTPATIENT)
Dept: GASTROENTEROLOGY | Facility: CLINIC | Age: 25
End: 2025-05-16
Payer: MEDICAID

## 2025-05-16 NOTE — TELEPHONE ENCOUNTER
----- Message from Med Assistant Almazan sent at 5/16/2025 10:29 AM CDT -----    ----- Message -----  From: Bridgette Kim  Sent: 5/16/2025  10:11 AM CDT  To: Domenica Horta Staff    Type:  requesting a callWho Called: pt mom Would the patient rather a call back or a response via [facility]? Call Best Call Back Number: [phone] Information:regarding pt  having major side effects from medication adalimumab (HUMIRA,CF, PEN) 40 mg/0.4 mL PnKt

## 2025-05-16 NOTE — TELEPHONE ENCOUNTER
Mom called because the patient has been having severe headaches and the tylenol has not been helping, the headaches wake him up at night.She is questioning if this could be a side affect of the Humeria. Mom asked about dehydration and what she could give him and if the electrolyte water is as good as the Gatorade because of the sugar content. I sent a message to Dr Metzger

## 2025-05-19 ENCOUNTER — PATIENT MESSAGE (OUTPATIENT)
Dept: GASTROENTEROLOGY | Facility: CLINIC | Age: 25
End: 2025-05-19
Payer: MEDICAID

## 2025-06-08 ENCOUNTER — NURSE TRIAGE (OUTPATIENT)
Dept: ADMINISTRATIVE | Facility: CLINIC | Age: 25
End: 2025-06-08
Payer: MEDICAID

## 2025-06-08 ENCOUNTER — PATIENT OUTREACH (OUTPATIENT)
Facility: OTHER | Age: 25
End: 2025-06-08
Payer: MEDICAID

## 2025-06-08 ENCOUNTER — OCHSNER VIRTUAL EMERGENCY DEPARTMENT (OUTPATIENT)
Facility: CLINIC | Age: 25
End: 2025-06-08
Payer: MEDICAID

## 2025-06-08 NOTE — PLAN OF CARE-OVED
[facility] Emergency Department Plan of Care Note  Referral Source: Nurse On-Call                               Chief Complaint   Patient presents with    GI Bleeding     Per nurse on call, Patient's mother calling in. Patient is autistic. Patient's mother calling in to report the patient has been taking Humira for ulcerative colitis. For the last 2 days he has had dark black diarrhea. He's having intermittent abdominal cramping. A few days ago he had bright red blood in the stool that resolved. He has been looking pale for the last few weeks. Disposition provided - go to ER now. Mom asking if that is necessary with his history or if he can wait to be seen in clinic.   Recommendation: Emergency Department                            No diagnosis found.

## 2025-06-08 NOTE — PROGRESS NOTES
"Patient's mother spoke with OOC RN on 6/8/2025 with complaint of "Patient's mother calling in. Patient is autistic. Patient's mother calling in to report the patient has been taking Humira for ulcerative colitis. For the last 2 days he has had dark black diarrhea. He's having intermittent abdominal cramping. A few days ago he had bright red blood in the stool that resolved. He has been looking pale for the last few weeks."    OOC RN consulted with [facility] provider, Dr. Roth, and disposition recommended was emergency department.  Will follow up with patient's mother during the week of June 9-14, 2025 to assess if patient received the care she was needing or if have any additional concerns/needs to be addressed.     "

## 2025-06-08 NOTE — TELEPHONE ENCOUNTER
Patient's mother calling in. Patient is autistic. Patient's mother calling in to report the patient has been taking Humira for ulcerative colitis. For the last 2 days he has had dark black diarrhea. He's having intermittent abdominal cramping. A few days ago he had bright red blood in the stool that resolved. He has been looking pale for the last few weeks. Disposition provided - go to ER now. Mom asking if that is necessary with his history or if he can wait to be seen in clinic. Sent secure chat to [facility] and chart reviewed by Dr. Roth. He agreed with dispo that patient should be seen in ER now. Updated patient's mother. Instructed to call back with additional questions or worsening of symptoms. Patient's mother verbalized understanding.     Reason for Disposition   Black or tarry bowel movements  (Exception: Chronic-unchanged black-grey BMs AND is taking iron pills or Pepto-Bismol.)    Additional Information   Negative: Shock suspected (e.g., cold/pale/clammy skin, too weak to stand, low BP, rapid pulse)   Negative: Difficult to awaken or acting confused (e.g., disoriented, slurred speech)   Negative: Sounds like a life-threatening emergency to the triager   Negative: [1] SEVERE abdominal pain (e.g., excruciating) AND [2] present > 1 hour   Negative: [1] SEVERE abdominal pain AND [2] age > 60 years   Negative: [1] Blood in the stool AND [2] moderate or large amount of blood    Protocols used: Diarrhea-A-

## 2025-06-09 ENCOUNTER — TELEPHONE (OUTPATIENT)
Dept: GASTROENTEROLOGY | Facility: CLINIC | Age: 25
End: 2025-06-09
Payer: MEDICAID

## 2025-06-09 NOTE — TELEPHONE ENCOUNTER
I spoke with the mother and sent Dr Metzger a message to see if she can give him Pepto and turmeric and Dr Metzger said yes.

## 2025-06-11 ENCOUNTER — OFFICE VISIT (OUTPATIENT)
Dept: GASTROENTEROLOGY | Facility: CLINIC | Age: 25
End: 2025-06-11
Payer: MEDICAID

## 2025-06-11 VITALS
BODY MASS INDEX: 31.29 KG/M2 | DIASTOLIC BLOOD PRESSURE: 61 MMHG | HEIGHT: 68 IN | HEART RATE: 104 BPM | SYSTOLIC BLOOD PRESSURE: 107 MMHG | OXYGEN SATURATION: 95 % | WEIGHT: 206.44 LBS

## 2025-06-11 DIAGNOSIS — K51.919 ULCERATIVE COLITIS WITH COMPLICATION, UNSPECIFIED LOCATION: ICD-10-CM

## 2025-06-11 DIAGNOSIS — K92.1 BLOODY STOOLS: Primary | ICD-10-CM

## 2025-06-11 PROCEDURE — 1159F MED LIST DOCD IN RCRD: CPT | Mod: CPTII,,, | Performed by: INTERNAL MEDICINE

## 2025-06-11 PROCEDURE — 3074F SYST BP LT 130 MM HG: CPT | Mod: CPTII,,, | Performed by: INTERNAL MEDICINE

## 2025-06-11 PROCEDURE — 3008F BODY MASS INDEX DOCD: CPT | Mod: CPTII,,, | Performed by: INTERNAL MEDICINE

## 2025-06-11 PROCEDURE — 99215 OFFICE O/P EST HI 40 MIN: CPT | Mod: S$PBB,,, | Performed by: INTERNAL MEDICINE

## 2025-06-11 PROCEDURE — 99213 OFFICE O/P EST LOW 20 MIN: CPT | Mod: PBBFAC,PN | Performed by: INTERNAL MEDICINE

## 2025-06-11 PROCEDURE — 3078F DIAST BP <80 MM HG: CPT | Mod: CPTII,,, | Performed by: INTERNAL MEDICINE

## 2025-06-11 PROCEDURE — 99999 PR PBB SHADOW E&M-EST. PATIENT-LVL III: CPT | Mod: PBBFAC,,, | Performed by: INTERNAL MEDICINE

## 2025-06-11 RX ORDER — VITAMIN E (DL,TOCOPHERYL ACET) 45 MG/0.25
DROPS ORAL
COMMUNITY
Start: 2025-04-14

## 2025-06-11 NOTE — PROGRESS NOTES
Subjective:       Patient ID: Bro Lozada is a 24 y.o. male.    Chief Complaint: Rectal Bleeding, Hospital Follow Up, and Ulcerative Colitis    Ongoing diarrhea, dark stools, abd pain. Dizziness, fatigue, cramps.    Prior to the weekend was having 4-5 BM/day with looser stools in the evening.  Sudden increase in stool     Started tumeric and pepto prn    Ongoing red blood    Significant anxiety     About to do 3-4th maintenance pill    Next dose due on Wednesday (week from today).     Review of Systems   Constitutional:  Positive for unexpected weight change.   Gastrointestinal:  Positive for abdominal distention, abdominal pain, blood in stool and diarrhea.   Psychiatric/Behavioral:  The patient is nervous/anxious.        The following portions of the patient's history were reviewed and updated as appropriate: allergies, current medications, past family history, past medical history, past social history, past surgical history and problem list.    Objective:      Physical Exam  Constitutional:       Appearance: He is well-developed.   HENT:      Head: Normocephalic and atraumatic.   Eyes:      Conjunctiva/sclera: Conjunctivae normal.   Pulmonary:      Effort: Pulmonary effort is normal. No respiratory distress.   Neurological:      Mental Status: He is alert and oriented to person, place, and time.   Psychiatric:         Behavior: Behavior normal.         Thought Content: Thought content normal.         Judgment: Judgment normal.         Pertinent labs and imaging studies reviewed    Assessment:       1. Bloody stools    2. Ulcerative colitis with complication, unspecified location        Plan:       Uncertain what is driving current decompensation.  It seems like prior to this weekend patient is colitis was improving.  Could be infectious versus inadequately controlled IBD versus IBS  Send stool studies  Check adalimumab trough and antibody  Could consider resuming budesonide    50 minutes of total time spent on  the encounter, which includes face to face time and non-face to face time preparing to see the patient (eg, review of tests), Obtaining and/or reviewing separately obtained history, Documenting clinical information in the electronic or other health record, Independently interpreting results (not separately reported) and communicating results to the patient/family/caregiver, or Care coordination (not separately reported).     (Portions of this note were dictated using voice recognition software and may contain dictation related errors in spelling/grammar/syntax not found on text review)

## 2025-06-13 ENCOUNTER — TELEPHONE (OUTPATIENT)
Dept: GASTROENTEROLOGY | Facility: CLINIC | Age: 25
End: 2025-06-13
Payer: MEDICAID

## 2025-06-13 PROCEDURE — 87328 CRYPTOSPORIDIUM AG IA: CPT | Performed by: INTERNAL MEDICINE

## 2025-06-13 PROCEDURE — 83993 ASSAY FOR CALPROTECTIN FECAL: CPT | Performed by: INTERNAL MEDICINE

## 2025-06-13 PROCEDURE — 89055 LEUKOCYTE ASSESSMENT FECAL: CPT | Performed by: INTERNAL MEDICINE

## 2025-06-13 PROCEDURE — 87425 ROTAVIRUS AG IA: CPT | Performed by: INTERNAL MEDICINE

## 2025-06-17 ENCOUNTER — RESULTS FOLLOW-UP (OUTPATIENT)
Dept: GASTROENTEROLOGY | Facility: HOSPITAL | Age: 25
End: 2025-06-17

## 2025-06-17 ENCOUNTER — PATIENT OUTREACH (OUTPATIENT)
Facility: OTHER | Age: 25
End: 2025-06-17
Payer: MEDICAID

## 2025-06-17 ENCOUNTER — TELEPHONE (OUTPATIENT)
Dept: GASTROENTEROLOGY | Facility: CLINIC | Age: 25
End: 2025-06-17
Payer: MEDICAID

## 2025-06-17 NOTE — PROGRESS NOTES
Patient's mother spoke with OOC RN on 6/8/2025 with c/o dark black diarrhea, abdominal cramping, and pale skin.  [facility] was consulted and disposition recommendation was ED.  Patient presented to [facility] as advised.    Called patient's mother for ED follow up to assess for additional needs or concerns.  A voicemail message was left asking her to return the call.      Second follow up call scheduled 6/18/25.    Jo Ann Bañuelos  ED Navigator

## 2025-06-17 NOTE — TELEPHONE ENCOUNTER
----- Message from Mychal Metzger MD sent at 6/17/2025  4:05 PM CDT -----  Stool studies incomplete but calprotectin is elevated. Diarrhea likely d/t inadequately controlled colitis. Will repeat C diff (not run) and await Humira trough (ordered at time of most recent clinic   visit).  Could consider restarting budesonide once C diff excluded.  ----- Message -----  From: Lab, Background User  Sent: 6/14/2025  12:09 PM CDT  To: Mychal Metzger MD

## 2025-06-18 ENCOUNTER — TELEPHONE (OUTPATIENT)
Dept: GASTROENTEROLOGY | Facility: CLINIC | Age: 25
End: 2025-06-18
Payer: MEDICAID

## 2025-06-18 PROCEDURE — 87324 CLOSTRIDIUM AG IA: CPT | Performed by: INTERNAL MEDICINE

## 2025-06-18 PROCEDURE — 87046 STOOL CULTR AEROBIC BACT EA: CPT | Mod: 59 | Performed by: INTERNAL MEDICINE

## 2025-06-18 PROCEDURE — 87427 SHIGA-LIKE TOXIN AG IA: CPT | Performed by: INTERNAL MEDICINE

## 2025-06-18 PROCEDURE — 87045 FECES CULTURE AEROBIC BACT: CPT | Performed by: INTERNAL MEDICINE

## 2025-06-23 ENCOUNTER — TELEPHONE (OUTPATIENT)
Dept: GASTROENTEROLOGY | Facility: CLINIC | Age: 25
End: 2025-06-23
Payer: MEDICAID

## 2025-06-23 ENCOUNTER — PATIENT MESSAGE (OUTPATIENT)
Dept: GASTROENTEROLOGY | Facility: HOSPITAL | Age: 25
End: 2025-06-23
Payer: MEDICAID

## 2025-06-23 DIAGNOSIS — K51.919 ULCERATIVE COLITIS WITH COMPLICATION, UNSPECIFIED LOCATION: ICD-10-CM

## 2025-06-23 RX ORDER — BUDESONIDE 3 MG/1
9 CAPSULE, COATED PELLETS ORAL DAILY
Qty: 90 CAPSULE | Refills: 6 | Status: SHIPPED | OUTPATIENT
Start: 2025-06-23

## 2025-06-23 RX ORDER — ADALIMUMAB 40MG/0.4ML
40 KIT SUBCUTANEOUS
Qty: 4 PEN | Refills: 11 | Status: SHIPPED | OUTPATIENT
Start: 2025-06-23 | End: 2026-06-23

## 2025-06-23 NOTE — TELEPHONE ENCOUNTER
Patient's mother is still waiting on a call from Dr Metzger since Thursday 6/19/2025  I sent a secure chat to Dr Metzger

## 2025-07-01 ENCOUNTER — TELEPHONE (OUTPATIENT)
Dept: GASTROENTEROLOGY | Facility: CLINIC | Age: 25
End: 2025-07-01
Payer: MEDICAID

## 2025-07-01 NOTE — TELEPHONE ENCOUNTER
Copied from CRM #[id number]. Topic: General Inquiry - Patient Advice  >> Jun 30, 2025  1:38 PM Yael wrote:  .Type:  Needs Medical Advice    Who Called: pt layne Alcala    Would the patient rather a call back or a response via [facility]? Call back  Best Call Back Number: [phone]  Additional Information:      Pt mom stated she would like a call back because his medication wasn't filled at the pharmacy and the doctor has to call the pharmacy directly to get it expedited  >> Jul 1, 2025  9:07 AM Med Assistant Robert wrote:    ----- Message -----  From: Yael Amaya  Sent: 6/30/2025   1:40 PM CDT  To: Domenica Vo

## 2025-07-09 ENCOUNTER — TELEPHONE (OUTPATIENT)
Dept: GASTROENTEROLOGY | Facility: CLINIC | Age: 25
End: 2025-07-09
Payer: MEDICAID

## 2025-07-09 NOTE — TELEPHONE ENCOUNTER
Copied from CRM #[id number]. Topic: Medications - Pharmacy  >> Jul 9, 2025  8:17 AM Hilda wrote:  Type:  Pharmacy Calling to Clarify an RX    Name of Caller:Pharmacy  Pharmacy Name:[facility]  Prescription Name:  adalimumab (HUMIRA,CF, PEN) 40 mg/0.4 mL PnKt    What do they need to clarify?:Directions  Best Call Back Number:[phone]  Additional Information:  >> Jul 9, 2025  8:33 AM Med Assistant Robert wrote:    ----- Message -----  From: Hilda Fry  Sent: 7/9/2025   8:18 AM CDT  To: Domenica Vo

## 2025-07-09 NOTE — TELEPHONE ENCOUNTER
Gave [facility] pharmacy the information about the increase in his use of the Humira.

## 2025-07-22 ENCOUNTER — TELEPHONE (OUTPATIENT)
Dept: GASTROENTEROLOGY | Facility: CLINIC | Age: 25
End: 2025-07-22
Payer: MEDICAID

## 2025-07-22 NOTE — TELEPHONE ENCOUNTER
Contact with pharmacy made. Confirmed the Humira was authorized and shipped to the patient. Tracking number for the medication is: [id number]. Medication is scheduled to arrive at the home tomorrow. Callback message left. Discuss and provide tracking information.

## 2025-07-22 NOTE — TELEPHONE ENCOUNTER
Copied from CRM #[id number]. Topic: Medications - Pharmacy  >> Jul 22, 2025  9:48 AM Aram wrote:  Type:  Pharmacy Calling to Clarify an RX    Name of Caller:Adrienne  Pharmacy Name:[facility]  Prescription Name:adalimumab (HUMIRA,CF, PEN) 40 mg/0.4 mL PnKt  What do they need to clarify?:prior auth needed  Best Call Back Number:[phone] fax:[phone]  Additional Information: [facility] urgent  >> Jul 22, 2025  9:55 AM Med Assistant Robert wrote:    ----- Message -----  From: Aram Bolanos  Sent: 7/22/2025   9:51 AM CDT  To: Domenica Vo

## 2025-08-07 ENCOUNTER — OFFICE VISIT (OUTPATIENT)
Dept: GASTROENTEROLOGY | Facility: CLINIC | Age: 25
End: 2025-08-07
Payer: MEDICAID

## 2025-08-07 DIAGNOSIS — K51.011 ULCERATIVE (CHRONIC) PANCOLITIS WITH RECTAL BLEEDING: Primary | ICD-10-CM

## 2025-08-07 RX ORDER — SODIUM, POTASSIUM,MAG SULFATES 17.5-3.13G
1 SOLUTION, RECONSTITUTED, ORAL ORAL DAILY
Qty: 1 KIT | Refills: 0 | Status: SHIPPED | OUTPATIENT
Start: 2025-08-07 | End: 2025-08-09

## 2025-08-07 NOTE — PROGRESS NOTES
Subjective:       Patient ID: Bro Lozada is a 24 y.o. male.    Chief Complaint: UC    Telemedicine encounter today to follow-up with aforementioned complaints.  He has previously seen by me in June follow-up of ulcerative colitis.  At that time he was having worsening of his diarrhea, experiencing 4-5 loose bowel movements per day.  He was sent for Humira labs and stool studies:  Low trough and elevated calprotectin.  His Humira was subsequently increased to weekly.  Today patient reports    Ok. Denies blood in stool x last 2 weeks. Stool frequency has improved: now 2-3 bm/day.     Had some issues with PA. Was without for 2 weeks. Now resolved. Frustrated, appropriately      Review of Systems   Gastrointestinal:  Negative for anal bleeding and diarrhea.         Patient is accompanied by his mother who corroborates above history.    The following portions of the patient's history were reviewed and updated as appropriate: allergies, current medications, past family history, past medical history, past social history, past surgical history and problem list.    Objective:      Physical Exam  Constitutional:       Appearance: He is well-developed.   HENT:      Head: Normocephalic and atraumatic.   Eyes:      Conjunctiva/sclera: Conjunctivae normal.   Pulmonary:      Effort: Pulmonary effort is normal. No respiratory distress.   Neurological:      Mental Status: He is alert and oriented to person, place, and time.   Psychiatric:         Behavior: Behavior normal.         Thought Content: Thought content normal.         Judgment: Judgment normal.           Pertinent labs and imaging studies reviewed    Assessment:       1. Ulcerative (chronic) pancolitis with rectal bleeding        Plan:       Doing better on weekly Humira, however remains on budesonide as well.  Will attempt to wean:  Go down to 6 mg of budesonide daily x2 weeks, followed by 3 mg daily x2 weeks, followed by discontinuation   Repeat colonoscopy in  October/November to document healing    (Portions of this note were dictated using voice recognition software and may contain dictation related errors in spelling/grammar/syntax not found on text review)        The patient location is: [place]  The chief complaint leading to consultation is: UC    Visit type: audiovisual    Face to Face time with patient: 15  30 minutes of total time spent on the encounter, which includes face to face time and non-face to face time preparing to see the patient (eg, review of tests), Obtaining and/or reviewing separately obtained history, Documenting clinical information in the electronic or other health record, Independently interpreting results (not separately reported) and communicating results to the patient/family/caregiver, or Care coordination (not separately reported).         Each patient to whom he or she provides medical services by telemedicine is:  (1) informed of the relationship between the physician and patient and the respective role of any other health care provider with respect to management of the patient; and (2) notified that he or she may decline to receive medical services by telemedicine and may withdraw from such care at any time.    Notes: